# Patient Record
Sex: FEMALE | URBAN - NONMETROPOLITAN AREA
[De-identification: names, ages, dates, MRNs, and addresses within clinical notes are randomized per-mention and may not be internally consistent; named-entity substitution may affect disease eponyms.]

---

## 2023-05-02 ENCOUNTER — TELEPHONE (OUTPATIENT)
Dept: WOUND CARE | Age: 38
End: 2023-05-02

## 2023-05-02 NOTE — TELEPHONE ENCOUNTER
----- Message from Radha Andrade sent at 5/2/2023 11:40 AM EDT -----  [address]ellar TO SEE IF WE HAD GOT THE REFERRAL, I CONFIRMED WE DID AND THAT SHE IS SCHEDULED FOR 5/12 @ 9:30.  AMALIA ASKS IF DEMETRIA CAN BE SEEN SOONER?

## 2023-05-02 NOTE — TELEPHONE ENCOUNTER
Spoke with Yudy Orozco and informed her there are no sooner appointments at this time. Notified her we would watch out for cancellations and if a sooner appointment comes open we will call patient. Gena voices understanding.

## 2023-05-12 ENCOUNTER — TELEPHONE (OUTPATIENT)
Dept: WOUND CARE | Age: 38
End: 2023-05-12

## 2023-05-12 NOTE — TELEPHONE ENCOUNTER
Patient called wound clinic and canceled appointment for today. Patient states she has an emergency. Offered to reschedule appointment but patient would like to call back to reschedule appointment.